# Patient Record
Sex: MALE | NOT HISPANIC OR LATINO | URBAN - METROPOLITAN AREA
[De-identification: names, ages, dates, MRNs, and addresses within clinical notes are randomized per-mention and may not be internally consistent; named-entity substitution may affect disease eponyms.]

---

## 2024-04-18 ENCOUNTER — OV NP (OUTPATIENT)
Dept: URBAN - METROPOLITAN AREA CLINIC 94 | Facility: CLINIC | Age: 39
End: 2024-04-18
Payer: COMMERCIAL

## 2024-04-18 VITALS
TEMPERATURE: 97.2 F | WEIGHT: 222 LBS | BODY MASS INDEX: 31.08 KG/M2 | DIASTOLIC BLOOD PRESSURE: 87 MMHG | HEIGHT: 71 IN | SYSTOLIC BLOOD PRESSURE: 141 MMHG | HEART RATE: 72 BPM

## 2024-04-18 DIAGNOSIS — K58.0 IRRITABLE BOWEL SYNDROME WITH DIARRHEA: ICD-10-CM

## 2024-04-18 DIAGNOSIS — K21.00 GASTROESOPHAGEAL REFLUX DISEASE WITH ESOPHAGITIS WITHOUT HEMORRHAGE: ICD-10-CM

## 2024-04-18 PROBLEM — 266433003: Status: ACTIVE | Noted: 2024-04-18

## 2024-04-18 PROBLEM — 197125005: Status: ACTIVE | Noted: 2024-04-18

## 2024-04-18 PROCEDURE — 99204 OFFICE O/P NEW MOD 45 MIN: CPT | Performed by: INTERNAL MEDICINE

## 2024-04-18 RX ORDER — RIFAXIMIN 550 MG/1
1 TABLET TABLET ORAL THREE TIMES A DAY
Qty: 42 TABLET | Refills: 2 | OUTPATIENT
Start: 2024-04-18 | End: 2024-05-30

## 2024-04-18 RX ORDER — PANTOPRAZOLE SODIUM 40 MG/1
1 TABLET TABLET, DELAYED RELEASE ORAL ONCE A DAY
Status: ACTIVE | COMMUNITY

## 2024-04-18 NOTE — HPI-TODAY'S VISIT:
37 y/o M hx of EPI, GERD presents for NP eval of diarrhea, abdominal pain, acid reflux. Referred by Dr. Uriah Espinosa - a copy of this document will be provided to referring provider.   Diarrhea has been chronic x 8 yrs. Reports it is intermittent and believes it is due to his diet however cannot ID specific food trigger. States with certain foods he will experience fecal urgency and immediate loose BM about 30 min after eating. Will still experience normal, formed BMs as diarrhea is intermittent and does not occur every day or with every PO intake. Also endorses crampy lower abdominal pain that precedes BMs. Denies steatorrhea, mucus in stool, blood in stool, dark tary stool. Denies constipation, nocturnal diarrhea.   Heartburn is well controlled on pantoprazole every other day. Denies current reflux, epigastric pain, n/v, dysphagia.   Prev est care with [facility] with most recent OV in Feb 2024. Was told he has EPI, underwent w/u including CT w/ and w/o, MRI w/o as well as EGD/EUS all of which were unremarkable beyond esophagitis, mild fatty liver, and mild fatty infiltration of pancreas. Was placed on pancreatic enzymes with no impact on sx.   11/2023 Stool studies: borderline E coli, fecal calpro  02/2024 CT a/p: unremarkable.  01/2023 CT triple phase: unremarkable.  12/2021 MRI w/ and w/o: Small gallstone. Pancreatic duct decompressed.   07/2023 EGD/EUS [facility]: LA grade B esophagitis. Pancreas unremarkable. Mild fatty infiltration of liver.  07/2023 CLS: 3 mm sigmoid TA. 4 mm ascending TA. Mild ileitis. EH,IH. Repeat 3 y.

## 2024-04-19 LAB
.: (no result)
Lab: (no result)

## 2024-04-20 LAB
C-REACTIVE PROTEIN, QUANT: 2
DEAMIDATED GLIADIN ABS, IGA: 8
DEAMIDATED GLIADIN ABS, IGG: 1
ENDOMYSIAL ANTIBODY IGA: NEGATIVE
IMMUNOGLOBULIN A, QN, SERUM: 165
T-TRANSGLUTAMINASE (TTG) IGA: <2
T-TRANSGLUTAMINASE (TTG) IGG: <2

## 2024-05-03 ENCOUNTER — LAB OUTSIDE AN ENCOUNTER (OUTPATIENT)
Dept: URBAN - METROPOLITAN AREA CLINIC 94 | Facility: CLINIC | Age: 39
End: 2024-05-03

## 2024-05-07 LAB — CALPROTECTIN, STOOL - QDX: (no result)

## 2024-11-29 ENCOUNTER — OFFICE VISIT (OUTPATIENT)
Dept: URGENT CARE | Facility: CLINIC | Age: 39
End: 2024-11-29
Payer: COMMERCIAL

## 2024-11-29 VITALS
HEART RATE: 103 BPM | BODY MASS INDEX: 30.1 KG/M2 | RESPIRATION RATE: 18 BRPM | WEIGHT: 215 LBS | OXYGEN SATURATION: 96 % | DIASTOLIC BLOOD PRESSURE: 67 MMHG | TEMPERATURE: 100 F | SYSTOLIC BLOOD PRESSURE: 114 MMHG | HEIGHT: 71 IN

## 2024-11-29 DIAGNOSIS — R50.9 FEVER, UNSPECIFIED FEVER CAUSE: Primary | ICD-10-CM

## 2024-11-29 LAB
CTP QC/QA: YES
CTP QC/QA: YES
POC MOLECULAR INFLUENZA A AGN: NEGATIVE
POC MOLECULAR INFLUENZA B AGN: NEGATIVE
SARS-COV-2 AG RESP QL IA.RAPID: NEGATIVE

## 2024-11-29 RX ORDER — DICYCLOMINE HYDROCHLORIDE 20 MG/1
20 TABLET ORAL EVERY 6 HOURS
Qty: 20 TABLET | Refills: 0 | Status: SHIPPED | OUTPATIENT
Start: 2024-11-29 | End: 2024-12-04

## 2024-11-29 RX ORDER — ONDANSETRON 4 MG/1
8 TABLET, FILM COATED ORAL EVERY 8 HOURS PRN
Qty: 30 TABLET | Refills: 0 | Status: SHIPPED | OUTPATIENT
Start: 2024-11-29 | End: 2024-12-04

## 2024-11-29 NOTE — PATIENT INSTRUCTIONS
Fever, unspecified fever cause  -     SARS Coronavirus 2 Antigen, POCT Manual Read  -     POCT Influenza A/B MOLECULAR    -     dicyclomine (BENTYL) 20 mg tablet; Take 1 tablet (20 mg total) by mouth every 6 (six) hours. for 5 days  Dispense: 20 tablet; Refill: 0    -     ondansetron (ZOFRAN) 4 MG tablet; Take 2 tablets (8 mg total) by mouth every 8 (eight) hours as needed for Nausea.  Dispense: 30 tablet; Refill: 0      - Take OTC Gas-x (simethicone) for abdominal discomfort.      - Avoid Imodium (if possible).    - Drink plenty of electrolytes and fluids.      Start off with liquid diet and progress as tolerated (see below)  Water and clear liquids are important so you do not get dehydrated. Drink a small amount at a time.    Do not force yourself to eat, especially if you have cramps, vomiting, or diarrhea. When you finally decide to start eating, do not eat large amounts at a time, even if you are hungry.    -  [place] diet.    If you eat, avoid fatty, greasy, spicy, or fried foods.  Do not eat dairy products if you have diarrhea; they can make the diarrhea worse    ED Precautions   If your symptoms worsen or fail to improve you should go to Emergency Department.     Watch for any increase pain, fever, localized pain to right lower abdomen, continued vomiting or diarrhea, not urinating, or blood in the stool.

## 2024-11-29 NOTE — PROGRESS NOTES
"Subjective:      Patient ID: Irineo Kearney is a 39 y.o. male.    Vitals:  height is 5' 11" (1.803 m) and weight is 97.5 kg (215 lb). His oral temperature is 99.7 °F (37.6 °C). His blood pressure is 114/67 and his pulse is 103. His respiration is 18 and oxygen saturation is 96%.     Chief Complaint: Emesis    Pt is a 40 yo male presenting with fever,chills,vomiting, cough and headache. Onset of symptoms was 12 hours ago.  Pt reports using a prescription given a month ago for possible flu.    Emesis   This is a new problem. The current episode started yesterday. The problem occurs less than 2 times per day. The problem has been gradually worsening. The emesis has an appearance of bile. The maximum temperature recorded prior to his arrival was 100.4 - 100.9 F. Associated symptoms include chills, coughing, diarrhea, a fever, headaches and sweats. Pertinent negatives include no abdominal pain or chest pain. The treatment provided mild relief.   Fever   This is a new problem. The current episode started 1 day ago. The problem occurs constantly. The problem has been unchanged. He has not experienced a heat injury.His temperature was unmeasured prior to arrival. Associated symptoms include congestion, coughing, diarrhea, headaches and vomiting. Pertinent negatives include no abdominal pain, chest pain, ear pain, nausea or sore throat.       Constitution: Positive for chills and fever. Negative for fatigue.   HENT:  Positive for congestion and postnasal drip. Negative for ear pain, sinus pain and sore throat.    Cardiovascular:  Negative for chest pain.   Respiratory:  Positive for cough and sputum production. Negative for shortness of breath.    Gastrointestinal:  Positive for vomiting and diarrhea. Negative for abdominal pain and nausea.   Neurological:  Positive for headaches.      Objective:     Physical Exam   Constitutional: He is oriented to person, place, and time.   HENT:   Head: Normocephalic.   Ears:   Right Ear: " Hearing and external ear normal. No no drainage, swelling or tenderness. Tympanic membrane is not erythematous, not retracted and not bulging. No middle ear effusion.   Left Ear: Hearing and external ear normal. No no drainage, swelling or tenderness. Tympanic membrane is not erythematous, not retracted and not bulging.  No middle ear effusion.   Nose: Nose normal. No mucosal edema, rhinorrhea or purulent discharge. Right sinus exhibits no maxillary sinus tenderness and no frontal sinus tenderness. Left sinus exhibits no maxillary sinus tenderness and no frontal sinus tenderness.   Mouth/Throat: Uvula is midline, oropharynx is clear and moist and mucous membranes are normal. No trismus in the jaw. No uvula swelling. No oropharyngeal exudate, posterior oropharyngeal edema or posterior oropharyngeal erythema.   Eyes: EOM are normal.   Cardiovascular: Normal rate and regular rhythm.   Pulmonary/Chest: Effort normal and breath sounds normal. No respiratory distress.   Musculoskeletal: Normal range of motion.         General: Normal range of motion.   Neurological: He is alert and oriented to person, place, and time.   Skin: Skin is warm and dry.   Nursing note and vitals reviewed.      Assessment:     1. Fever, unspecified fever cause        Plan:       Fever, unspecified fever cause  -     SARS Coronavirus 2 Antigen, POCT Manual Read  -     POCT Influenza A/B MOLECULAR  -     dicyclomine (BENTYL) 20 mg tablet; Take 1 tablet (20 mg total) by mouth every 6 (six) hours. for 5 days  Dispense: 20 tablet; Refill: 0  -     ondansetron (ZOFRAN) 4 MG tablet; Take 2 tablets (8 mg total) by mouth every 8 (eight) hours as needed for Nausea.  Dispense: 30 tablet; Refill: 0      Patient Instructions   Fever, unspecified fever cause  -     SARS Coronavirus 2 Antigen, POCT Manual Read  -     POCT Influenza A/B MOLECULAR    -     dicyclomine (BENTYL) 20 mg tablet; Take 1 tablet (20 mg total) by mouth every 6 (six) hours. for 5 days   Dispense: 20 tablet; Refill: 0    -     ondansetron (ZOFRAN) 4 MG tablet; Take 2 tablets (8 mg total) by mouth every 8 (eight) hours as needed for Nausea.  Dispense: 30 tablet; Refill: 0      - Take OTC Gas-x (simethicone) for abdominal discomfort.      - Avoid Imodium (if possible).    - Drink plenty of electrolytes and fluids.      Start off with liquid diet and progress as tolerated (see below)  Water and clear liquids are important so you do not get dehydrated. Drink a small amount at a time.    Do not force yourself to eat, especially if you have cramps, vomiting, or diarrhea. When you finally decide to start eating, do not eat large amounts at a time, even if you are hungry.    -  [place] diet.    If you eat, avoid fatty, greasy, spicy, or fried foods.  Do not eat dairy products if you have diarrhea; they can make the diarrhea worse    ED Precautions   If your symptoms worsen or fail to improve you should go to Emergency Department.     Watch for any increase pain, fever, localized pain to right lower abdomen, continued vomiting or diarrhea, not urinating, or blood in the stool.

## 2025-03-11 ENCOUNTER — OFFICE VISIT (OUTPATIENT)
Dept: URBAN - METROPOLITAN AREA CLINIC 94 | Facility: CLINIC | Age: 40
End: 2025-03-11
Payer: COMMERCIAL

## 2025-03-11 ENCOUNTER — DASHBOARD ENCOUNTERS (OUTPATIENT)
Age: 40
End: 2025-03-11

## 2025-03-11 VITALS
HEART RATE: 79 BPM | TEMPERATURE: 97.8 F | DIASTOLIC BLOOD PRESSURE: 86 MMHG | SYSTOLIC BLOOD PRESSURE: 122 MMHG | HEIGHT: 71 IN | OXYGEN SATURATION: 96 % | BODY MASS INDEX: 30.38 KG/M2 | WEIGHT: 217 LBS

## 2025-03-11 DIAGNOSIS — K76.0 HEPATIC STEATOSIS: ICD-10-CM

## 2025-03-11 DIAGNOSIS — K21.00 GASTROESOPHAGEAL REFLUX DISEASE WITH ESOPHAGITIS WITHOUT HEMORRHAGE: ICD-10-CM

## 2025-03-11 DIAGNOSIS — K58.0 IRRITABLE BOWEL SYNDROME WITH DIARRHEA: ICD-10-CM

## 2025-03-11 PROBLEM — 197321007: Status: ACTIVE | Noted: 2025-03-11

## 2025-03-11 PROCEDURE — 99214 OFFICE O/P EST MOD 30 MIN: CPT | Performed by: INTERNAL MEDICINE

## 2025-03-11 RX ORDER — FAMOTIDINE 40 MG/1
1 TABLET AT BEDTIME TABLET, FILM COATED ORAL ONCE A DAY
Status: ACTIVE | COMMUNITY

## 2025-03-11 NOTE — PHYSICAL EXAM NEUROLOGIC:
oriented to person, place and time, normal sensation, short and long term memory intact, sensory exam intact DISPLAY PLAN FREE TEXT

## 2025-03-11 NOTE — HPI-TODAY'S VISIT:
37 y/o M presents for f/u for GERD, IBS-D.   He reports he has been doing very well. He states the Xifaxan helped tremendously with his IBS-D sx. He admits it helped about 60% with his sx. He does report still some diarrhea but this has improved and he feels he is able to eat more foods and not be restrictive d/t his diet. He denies any abd pain or bloating. He did recently have a couple rounds of abx for pneumonia and a sinus infection which he felt worsened his sx but not quite back to his baseline. He denies any lower abd pain, constipation, blood or mucus in his stool.   His acid reflux is well controlled with OTC famotidine. He has been following a GERD diet which he also finds helps his sx at well. He Denies epigastric pain, n/v, dysphagia.   Prev est care with [facility] with most recent OV in Feb 2024. Was told he has EPI, underwent w/u including CT w/ and w/o, MRI w/o as well as EGD/EUS all of which were unremarkable beyond esophagitis, mild fatty liver, and mild fatty infiltration of pancreas. Was placed on pancreatic enzymes with no impact on sx.   11/2023 Stool studies: borderline E coli, fecal calpro 04/2024 Celiac neg. CRP < 2 05/2024 FC< 16  02/2024 CT a/p: unremarkable.  01/2023 CT triple phase: unremarkable.  12/2021 MRI w/ and w/o: Small gallstone. Pancreatic duct decompressed.   07/2023 EGD/EUS [facility]: LA grade B esophagitis. Pancreas unremarkable. Mild fatty infiltration of liver.  07/2023 CLS: 3 mm sigmoid TA. 4 mm ascending TA. Mild ileitis. EH,IH. Repeat 3 y.

## 2025-03-12 LAB
A/G RATIO: 1.9
ABSOLUTE BASOPHILS: 52
ABSOLUTE EOSINOPHILS: 99
ABSOLUTE LYMPHOCYTES: 2517
ABSOLUTE MONOCYTES: 458
ABSOLUTE NEUTROPHILS: 2674
ALBUMIN: 4.3
ALKALINE PHOSPHATASE: 56
ALT (SGPT): 24
AST (SGOT): 35
BASOPHILS: 0.9
BILIRUBIN, TOTAL: 0.8
BUN/CREATININE RATIO: (no result)
BUN: 10
CALCIUM: 9.4
CARBON DIOXIDE, TOTAL: 30
CHLORIDE: 104
CREATININE: 1.15
EGFR: 82
EOSINOPHILS: 1.7
GLOBULIN, TOTAL: 2.3
GLUCOSE: 95
HEMATOCRIT: 47.5
HEMOGLOBIN: 15.8
LYMPHOCYTES: 43.4
MCH: 30.7
MCHC: 33.3
MCV: 92.4
MONOCYTES: 7.9
MPV: 10.2
NEUTROPHILS: 46.1
PLATELET COUNT: 241
POTASSIUM: 4.6
PROTEIN, TOTAL: 6.6
RDW: 13.2
RED BLOOD CELL COUNT: 5.14
SODIUM: 141
WHITE BLOOD CELL COUNT: 5.8